# Patient Record
Sex: FEMALE | Race: BLACK OR AFRICAN AMERICAN | URBAN - METROPOLITAN AREA
[De-identification: names, ages, dates, MRNs, and addresses within clinical notes are randomized per-mention and may not be internally consistent; named-entity substitution may affect disease eponyms.]

---
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
## 2023-11-05 ENCOUNTER — HOSPITAL ENCOUNTER (INPATIENT)
Facility: HOSPITAL | Age: 18
LOS: 8 days | Discharge: HOME | DRG: 885 | End: 2023-11-13
Attending: STUDENT IN AN ORGANIZED HEALTH CARE EDUCATION/TRAINING PROGRAM | Admitting: PSYCHIATRY & NEUROLOGY

## 2023-11-05 DIAGNOSIS — F10.920 ACUTE ALCOHOLIC INTOXICATION WITHOUT COMPLICATION (HCC): Primary | ICD-10-CM

## 2023-11-05 PROBLEM — F32.4 MAJOR DEPRESSION SINGLE EPISODE, IN PARTIAL REMISSION (HCC): Status: ACTIVE | Noted: 2023-11-05

## 2023-11-05 PROBLEM — F33.0 MDD (MAJOR DEPRESSIVE DISORDER), RECURRENT EPISODE, MILD (HCC): Status: ACTIVE | Noted: 2023-11-05

## 2023-11-05 LAB
ALBUMIN SERPL-MCNC: 4.1 G/DL (ref 3.5–5)
ALBUMIN/GLOB SERPL: 0.9 (ref 1.1–2.2)
ALP SERPL-CCNC: 66 U/L (ref 45–117)
ALT SERPL-CCNC: 24 U/L (ref 12–78)
AMPHET UR QL SCN: NEGATIVE
ANION GAP SERPL CALC-SCNC: 9 MMOL/L (ref 5–15)
AST SERPL W P-5'-P-CCNC: 20 U/L (ref 15–37)
BARBITURATES UR QL SCN: NEGATIVE
BASOPHILS # BLD: 0.1 K/UL (ref 0–0.1)
BASOPHILS NFR BLD: 1 % (ref 0–1)
BENZODIAZ UR QL: NEGATIVE
BILIRUB SERPL-MCNC: 0.2 MG/DL (ref 0.2–1)
BUN SERPL-MCNC: 14 MG/DL (ref 6–20)
BUN/CREAT SERPL: 14 (ref 12–20)
CA-I BLD-MCNC: 10.1 MG/DL (ref 8.5–10.1)
CANNABINOIDS UR QL SCN: POSITIVE
CHLORIDE SERPL-SCNC: 110 MMOL/L (ref 97–108)
CO2 SERPL-SCNC: 25 MMOL/L (ref 21–32)
COCAINE UR QL SCN: POSITIVE
CREAT SERPL-MCNC: 0.98 MG/DL (ref 0.55–1.02)
DIFFERENTIAL METHOD BLD: NORMAL
EKG ATRIAL RATE: 83 BPM
EKG DIAGNOSIS: NORMAL
EKG P AXIS: 61 DEGREES
EKG P-R INTERVAL: 162 MS
EKG Q-T INTERVAL: 356 MS
EKG QRS DURATION: 86 MS
EKG QTC CALCULATION (BAZETT): 418 MS
EKG R AXIS: 66 DEGREES
EKG T AXIS: 46 DEGREES
EKG VENTRICULAR RATE: 83 BPM
EOSINOPHIL # BLD: 0 K/UL (ref 0–0.4)
EOSINOPHIL NFR BLD: 1 % (ref 0–7)
ERYTHROCYTE [DISTWIDTH] IN BLOOD BY AUTOMATED COUNT: 13.5 % (ref 11.5–14.5)
ETHANOL SERPL-MCNC: 261 MG/DL (ref 0–0.08)
ETHANOL SERPL-MCNC: 401 MG/DL (ref 0–0.08)
FLUAV RNA SPEC QL NAA+PROBE: NOT DETECTED
FLUBV RNA SPEC QL NAA+PROBE: NOT DETECTED
GLOBULIN SER CALC-MCNC: 4.6 G/DL (ref 2–4)
GLUCOSE SERPL-MCNC: 66 MG/DL (ref 65–100)
HCG SERPL QL: NEGATIVE
HCT VFR BLD AUTO: 44.7 % (ref 35–47)
HGB BLD-MCNC: 14.9 G/DL (ref 11.5–16)
IMM GRANULOCYTES # BLD AUTO: 0 K/UL (ref 0–0.04)
IMM GRANULOCYTES NFR BLD AUTO: 0 % (ref 0–0.5)
LYMPHOCYTES # BLD: 2.4 K/UL (ref 0.8–3.5)
LYMPHOCYTES NFR BLD: 38 % (ref 12–49)
Lab: ABNORMAL
MAGNESIUM SERPL-MCNC: 2.6 MG/DL (ref 1.6–2.4)
MCH RBC QN AUTO: 31.8 PG (ref 26–34)
MCHC RBC AUTO-ENTMCNC: 33.3 G/DL (ref 30–36.5)
MCV RBC AUTO: 95.5 FL (ref 80–99)
METHADONE UR QL: NEGATIVE
MONOCYTES # BLD: 0.5 K/UL (ref 0–1)
MONOCYTES NFR BLD: 8 % (ref 5–13)
NEUTS SEG # BLD: 3.4 K/UL (ref 1.8–8)
NEUTS SEG NFR BLD: 52 % (ref 32–75)
NRBC # BLD: 0 K/UL (ref 0–0.01)
NRBC BLD-RTO: 0 PER 100 WBC
OPIATES UR QL: NEGATIVE
PCP UR QL: NEGATIVE
PLATELET # BLD AUTO: 304 K/UL (ref 150–400)
PMV BLD AUTO: 10.1 FL (ref 8.9–12.9)
POTASSIUM SERPL-SCNC: 3.7 MMOL/L (ref 3.5–5.1)
PROT SERPL-MCNC: 8.7 G/DL (ref 6.4–8.2)
RBC # BLD AUTO: 4.68 M/UL (ref 3.8–5.2)
SARS-COV-2 RNA RESP QL NAA+PROBE: NOT DETECTED
SODIUM SERPL-SCNC: 144 MMOL/L (ref 136–145)
TRICHOMONAS RAPID AG: POSITIVE
WBC # BLD AUTO: 6.4 K/UL (ref 3.6–11)

## 2023-11-05 PROCEDURE — 83735 ASSAY OF MAGNESIUM: CPT

## 2023-11-05 PROCEDURE — 1240000000 HC EMOTIONAL WELLNESS R&B

## 2023-11-05 PROCEDURE — 87389 HIV-1 AG W/HIV-1&-2 AB AG IA: CPT

## 2023-11-05 PROCEDURE — 80307 DRUG TEST PRSMV CHEM ANLYZR: CPT

## 2023-11-05 PROCEDURE — 2580000003 HC RX 258: Performed by: STUDENT IN AN ORGANIZED HEALTH CARE EDUCATION/TRAINING PROGRAM

## 2023-11-05 PROCEDURE — 82077 ASSAY SPEC XCP UR&BREATH IA: CPT

## 2023-11-05 PROCEDURE — 85025 COMPLETE CBC W/AUTO DIFF WBC: CPT

## 2023-11-05 PROCEDURE — 36415 COLL VENOUS BLD VENIPUNCTURE: CPT

## 2023-11-05 PROCEDURE — 99285 EMERGENCY DEPT VISIT HI MDM: CPT

## 2023-11-05 PROCEDURE — 87808 TRICHOMONAS ASSAY W/OPTIC: CPT

## 2023-11-05 PROCEDURE — 93005 ELECTROCARDIOGRAM TRACING: CPT | Performed by: STUDENT IN AN ORGANIZED HEALTH CARE EDUCATION/TRAINING PROGRAM

## 2023-11-05 PROCEDURE — 84703 CHORIONIC GONADOTROPIN ASSAY: CPT

## 2023-11-05 PROCEDURE — 6370000000 HC RX 637 (ALT 250 FOR IP): Performed by: PSYCHIATRY & NEUROLOGY

## 2023-11-05 PROCEDURE — 87636 SARSCOV2 & INF A&B AMP PRB: CPT

## 2023-11-05 PROCEDURE — 80053 COMPREHEN METABOLIC PANEL: CPT

## 2023-11-05 RX ORDER — ACETAMINOPHEN 325 MG/1
650 TABLET ORAL EVERY 4 HOURS PRN
Status: DISCONTINUED | OUTPATIENT
Start: 2023-11-05 | End: 2023-11-13 | Stop reason: HOSPADM

## 2023-11-05 RX ORDER — LORAZEPAM 1 MG/1
1 TABLET ORAL ONCE
Status: COMPLETED | OUTPATIENT
Start: 2023-11-05 | End: 2023-11-05

## 2023-11-05 RX ORDER — 0.9 % SODIUM CHLORIDE 0.9 %
1000 INTRAVENOUS SOLUTION INTRAVENOUS ONCE
Status: COMPLETED | OUTPATIENT
Start: 2023-11-05 | End: 2023-11-05

## 2023-11-05 RX ORDER — CHLORDIAZEPOXIDE HYDROCHLORIDE 5 MG/1
10 CAPSULE, GELATIN COATED ORAL 3 TIMES DAILY
Status: DISCONTINUED | OUTPATIENT
Start: 2023-11-05 | End: 2023-11-08

## 2023-11-05 RX ORDER — TRAZODONE HYDROCHLORIDE 50 MG/1
50 TABLET ORAL NIGHTLY PRN
Status: DISCONTINUED | OUTPATIENT
Start: 2023-11-05 | End: 2023-11-13 | Stop reason: HOSPADM

## 2023-11-05 RX ORDER — SENNOSIDES A AND B 8.6 MG/1
1 TABLET, FILM COATED ORAL DAILY PRN
Status: DISCONTINUED | OUTPATIENT
Start: 2023-11-05 | End: 2023-11-13 | Stop reason: HOSPADM

## 2023-11-05 RX ORDER — HYDROXYZINE 50 MG/1
50 TABLET, FILM COATED ORAL 3 TIMES DAILY PRN
Status: DISCONTINUED | OUTPATIENT
Start: 2023-11-05 | End: 2023-11-13 | Stop reason: HOSPADM

## 2023-11-05 RX ADMIN — SODIUM CHLORIDE 1000 ML: 9 INJECTION, SOLUTION INTRAVENOUS at 05:07

## 2023-11-05 RX ADMIN — CHLORDIAZEPOXIDE HYDROCHLORIDE 10 MG: 5 CAPSULE ORAL at 23:45

## 2023-11-05 RX ADMIN — LORAZEPAM 1 MG: 1 TABLET ORAL at 23:45

## 2023-11-05 ASSESSMENT — SLEEP AND FATIGUE QUESTIONNAIRES
DO YOU USE A SLEEP AID: YES
DO YOU HAVE DIFFICULTY SLEEPING: YES
SLEEP PATTERN: DIFFICULTY FALLING ASLEEP;DISTURBED/INTERRUPTED SLEEP;RESTLESSNESS
AVERAGE NUMBER OF SLEEP HOURS: 9

## 2023-11-05 ASSESSMENT — LIFESTYLE VARIABLES
HOW MANY STANDARD DRINKS CONTAINING ALCOHOL DO YOU HAVE ON A TYPICAL DAY: 3 OR 4
HOW OFTEN DO YOU HAVE A DRINK CONTAINING ALCOHOL: 2-4 TIMES A MONTH

## 2023-11-05 ASSESSMENT — PAIN SCALES - GENERAL
PAINLEVEL_OUTOF10: 2
PAINLEVEL_OUTOF10: 0

## 2023-11-05 ASSESSMENT — PAIN DESCRIPTION - LOCATION: LOCATION: BUTTOCKS
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
## 2023-11-05 NOTE — ED NOTES
Pt in green gown, still has under clothes on (sweat pants, tank top, and bra)     Burke White, [address]  11/05/23 5666
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
## 2023-11-05 NOTE — DISCHARGE INSTRUCTIONS
Thank you! Thank you for allowing me to care for you in the emergency department. I sincerely hope that you are satisfied with your visit today. It is my goal to provide you with excellent care. Below you will find a list of your labs and imaging from your visit today if applicable. Should you have any questions regarding these results please do not hesitate to call the emergency department. Please review [facility] for a more detailed result list since the below list may not be comprehensive. Instructions on how to sign up to [employer] should be provided in this packet.     Labs -  Recent Results (from the past 12 hour(s))   CBC with Auto Differential    Collection Time: 11/05/23  4:33 AM   Result Value Ref Range    WBC 6.4 3.6 - 11.0 K/uL    RBC 4.68 3.80 - 5.20 M/uL    Hemoglobin 14.9 11.5 - 16.0 g/dL    Hematocrit 44.7 35.0 - 47.0 %    MCV 95.5 80.0 - 99.0 FL    MCH 31.8 26.0 - 34.0 PG    MCHC 33.3 30.0 - 36.5 g/dL    RDW 13.5 11.5 - 14.5 %    Platelets 324 297 - 871 K/uL    MPV 10.1 8.9 - 12.9 FL    Nucleated RBCs 0.0 0.0  WBC    nRBC 0.00 0.00 - 0.01 K/uL    Neutrophils % 52 32 - 75 %    Lymphocytes % 38 12 - 49 %    Monocytes % 8 5 - 13 %    Eosinophils % 1 0 - 7 %    Basophils % 1 0 - 1 %    Immature Granulocytes 0 0 - 0.5 %    Neutrophils Absolute 3.4 1.8 - 8.0 K/UL    Lymphocytes Absolute 2.4 0.8 - 3.5 K/UL    Monocytes Absolute 0.5 0.0 - 1.0 K/UL    Eosinophils Absolute 0.0 0.0 - 0.4 K/UL    Basophils Absolute 0.1 0.0 - 0.1 K/UL    Absolute Immature Granulocyte 0.0 0.00 - 0.04 K/UL    Differential Type AUTOMATED     Comprehensive Metabolic Panel    Collection Time: 11/05/23  4:33 AM   Result Value Ref Range    Sodium 144 136 - 145 mmol/L    Potassium 3.7 3.5 - 5.1 mmol/L    Chloride 110 (H) 97 - 108 mmol/L    CO2 25 21 - 32 mmol/L    Anion Gap 9 5 - 15 mmol/L    Glucose 66 65 - 100 mg/dL    BUN 14 6 - 20 mg/dL    Creatinine 0.98 0.55 - 1.02 mg/dL    Bun/Cre Ratio 14 12 - 20      Est, Glom Filt
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
## 2023-11-05 NOTE — ED NOTES
Nurse asked pt if she had any thoughts of wanting to harm herself or anyone else. Pt shook her head no and then yes and stated \"I just wanna go home. \" Pt was then asked if she had a plan to kill herself, she shook her head yes.       Anitha Green [place]  11/05/23 7991

## 2023-11-05 NOTE — ED NOTES
[facility]. Items removed from patient include     1 pink and orange large bag with multiple items in it. Patient states that mother can take bag with the items in it home.          Ave Peterson RN  11/05/23 8431
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
## 2023-11-05 NOTE — BSMART NOTE
Per Margarette with D19, TDO is recommended for this pt. Pt aware. Nilesh/Michaelle RN aware.   Dr Yasir Freedman aware

## 2023-11-05 NOTE — ED NOTES
Pt's mother called and spoke with Nurse. Mother states she going to try and come sit with her or get someone to come sit with her.        Arpit Russ RN  11/05/23 0959

## 2023-11-06 LAB
HIV 1+2 AB+HIV1 P24 AG SERPL QL IA: NONREACTIVE
HIV 1/2 RESULT COMMENT: NORMAL

## 2023-11-06 PROCEDURE — 6370000000 HC RX 637 (ALT 250 FOR IP): Performed by: PSYCHIATRY & NEUROLOGY

## 2023-11-06 PROCEDURE — 6370000000 HC RX 637 (ALT 250 FOR IP): Performed by: FAMILY MEDICINE

## 2023-11-06 PROCEDURE — 1240000000 HC EMOTIONAL WELLNESS R&B

## 2023-11-06 RX ORDER — METRONIDAZOLE 500 MG/1
500 TABLET ORAL EVERY 12 HOURS SCHEDULED
Status: COMPLETED | OUTPATIENT
Start: 2023-11-06 | End: 2023-11-13

## 2023-11-06 RX ORDER — FLUOXETINE 10 MG/1
10 CAPSULE ORAL DAILY
Status: DISCONTINUED | OUTPATIENT
Start: 2023-11-06 | End: 2023-11-08

## 2023-11-06 RX ADMIN — METRONIDAZOLE 500 MG: 500 TABLET ORAL at 21:11

## 2023-11-06 RX ADMIN — CHLORDIAZEPOXIDE HYDROCHLORIDE 10 MG: 5 CAPSULE ORAL at 14:35

## 2023-11-06 RX ADMIN — CHLORDIAZEPOXIDE HYDROCHLORIDE 10 MG: 5 CAPSULE ORAL at 21:11

## 2023-11-06 RX ADMIN — CHLORDIAZEPOXIDE HYDROCHLORIDE 10 MG: 5 CAPSULE ORAL at 08:12

## 2023-11-06 RX ADMIN — FLUOXETINE 10 MG: 10 CAPSULE ORAL at 14:35

## 2023-11-06 RX ADMIN — TRAZODONE HYDROCHLORIDE 50 MG: 50 TABLET ORAL at 21:11
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
## 2023-11-06 NOTE — BH NOTE
Pt positive for trichomonas. Dr Pebbles Hough notified. Dr Mckinley Barriga notified and verbal telephone orders given to start pt on flagyl for 7 days, first dose tonight.

## 2023-11-06 NOTE — CARE COORDINATION
11/06/23 1403   ITP   Date of Plan 11/06/23   Date of Next Review 11/16/23   Primary Diagnosis Code MDD (major depressive disorder), recurrent episode, mild ([address]) F33.0   Barriers to Treatment Need for psychoeducation   Strengths Incorporated in Plan Acknowledging need for assistance   Plan of Care   Long Term Goal (LTG) Stated in patient/guardian terms \"go home\"   Short Term Goal 1   Short Term Goal 1 Client will be oriented to program and staff, and participate in assessment process   Baseline Functioning to make patient comfortable with the environment while in the hospital   Target patient will be able to approach staff and voice appropriate needs   Objectives Client will participate in individual therapy;Client will participate in group therapy   Intervention 1 Assess safety   Frequency daily   Measured by Self report;Staff observation   Staff Responsible Clinical staff;[facility] staff   Intervention 2 Acknowledge client strengths   Frequency daily   Measured by Self report;Staff observation   Staff Responsible Clinical staff;[facility] staff   Intervention 3 Group therapy   Frequency daily   Measured by Staff observation;Self report   Staff Responsible Clinical staff;[facility] staff   STG Goal 1 Status: Patient Appears to be  Progressing toward treatment plan goal   Short Term Goal 2   Short Term Goal 2 Client will learn and demonstrate effective coping skills   Baseline Functioning to improve the over all quality of life   Target the patient will be able to use positive skills to deal with dialy life stressors   Objectives Client will participate in group therapy;Client will participate in individual therapy   Intervention 1 Indvidual therapy   Frequency daily   Measured by Self report;Staff observation   Staff Responsible Clinical staff;[facility] staff   Intervention 2 Group therapy   Frequency dialy   Measured by Staff observation;Self report   Staff Responsible Clinical staff;[facility] staff   Intervention 3 Indvidual therapy

## 2023-11-06 NOTE — GROUP NOTE
Group Therapy Note    Date: 11/6/2023    Group Start Time: 0802  Group End Time: 1400  Group Topic: Process Group - Inpatient    [facility] 2 [facility], [address]        Group Therapy Note  Process group was focused on the [email or web address]. Writer provided the pts with an informational sheet on the emotional, cummings and, reasonable mind. Pt interacted well with one another and provided positive feedback and encouragement. Writer asked the pts to describe their lives as a book title.    Attendees: 4-11         Notes:  Pt was encouraged to attend and chose not to       Signature:  Jaun Lino

## 2023-11-06 NOTE — GROUP NOTE
Group Therapy Note    Date: 11/6/2023    Group Start Time: 1115  Group End Time: 1200  Group Topic: Education Group - Inpatient    [facility] 2 [address] [facility] NON ACUTE    Shilpa Cowan        Group Therapy Note    Facilitated group to introduce the definition of self-esteem and discuss information relating to creating steps to greater self-appreciation and recognizing symptoms of self-defeat     Attendees: 2/12      Notes:  Encouraged but did not attend    Discipline Responsible: Recreational Therapist      Signature:  YEYO Blount

## 2023-11-06 NOTE — GROUP NOTE
Group Therapy Note    Date: 11/6/2023    Group Start Time: 4387  Group End Time: 6857  Group Topic: Recreational    SSR 2 [facility] NON ACUTE    Kailyn Mathis        Group Therapy Note    Pt was receptive to listening to music and songs selected while working on leisure task. Interacted with peers and staff.  Declined to work on leisure task     Attendees: 5/10       Notes:  Encouraged but did not attend    Discipline Responsible: Recreational Therapist      Signature:  Randy Conklin

## 2023-11-06 NOTE — PLAN OF CARE
Problem: Anxiety  Goal: Will report anxiety at manageable levels  Description: INTERVENTIONS:  1. Administer medication as ordered  2. Teach and rehearse alternative coping skills  3. Provide emotional support with 1:1 interaction with staff  Outcome: [address] [facility] 114 Not Progressing     Problem: Coping  Goal: Pt/Family able to verbalize concerns and demonstrate effective coping strategies  Description: INTERVENTIONS:  1. Assist patient/family to identify coping skills, available support systems and cultural and spiritual values  2. Provide emotional support, including active listening and acknowledgement of concerns of patient and caregivers  3. Reduce environmental stimuli, as able  4. Instruct patient/family in relaxation techniques, as appropriate  5. Assess for spiritual pain/suffering and initiate Spiritual Care, Psychosocial Clinical Specialist consults as needed  Outcome: [address] [facility] 114 Not Progressing     Problem: Depression/Self Harm  Goal: Effect of psychiatric condition will be minimized and patient will be protected from self harm  Description: INTERVENTIONS:  1. Assess impact of patient's symptoms on level of functioning, self care needs and offer support as indicated  2. Assess patient/family knowledge of depression, impact on illness and need for teaching  3. Provide emotional support, presence and reassurance  4. Assess for possible suicidal thoughts or ideation. If patient expresses suicidal thoughts or statements do not leave alone, initiate Suicide Precautions, move to a room close to the nursing station and obtain sitter  5.  Initiate consults as appropriate with Mental Health Professional, Spiritual Care, Psychosocial CNS, and consider a recommendation to the LIP for a Psychiatric Consultation  Outcome: [address] [facility] 114 Not Progressing
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
## 2023-11-06 NOTE — ED NOTES
Report to [facility] on 2S. Patient up with Security, [address] and [facility] per [facility].       Ibeth Strange, [address]  11/05/23 2111

## 2023-11-06 NOTE — BH NOTE
DAY SHIFT    Pt up ad gilma on unit. Pt isolative to room most of the day. Pt does attend to ADLs and personal hygiene. Pt withdrawn and guarded. Pt presents with flat affect. Pt denies depression and anxiety. Pt denies SI/HI. Pt denies AVH. Pt calm and cooperative. Pt denies signs and symptoms of withdrawal and none observed. CIWA-0. Pt takes medication as prescribed without any complaints. Close observations continued to ensure pt safety.

## 2023-11-07 PROCEDURE — 6370000000 HC RX 637 (ALT 250 FOR IP): Performed by: PSYCHIATRY & NEUROLOGY

## 2023-11-07 PROCEDURE — 6370000000 HC RX 637 (ALT 250 FOR IP): Performed by: FAMILY MEDICINE

## 2023-11-07 PROCEDURE — 1240000000 HC EMOTIONAL WELLNESS R&B

## 2023-11-07 RX ADMIN — TRAZODONE HYDROCHLORIDE 50 MG: 50 TABLET ORAL at 20:33

## 2023-11-07 RX ADMIN — CHLORDIAZEPOXIDE HYDROCHLORIDE 10 MG: 5 CAPSULE ORAL at 20:33

## 2023-11-07 RX ADMIN — CHLORDIAZEPOXIDE HYDROCHLORIDE 10 MG: 5 CAPSULE ORAL at 08:14

## 2023-11-07 RX ADMIN — FLUOXETINE 10 MG: 10 CAPSULE ORAL at 08:13

## 2023-11-07 RX ADMIN — METRONIDAZOLE 500 MG: 500 TABLET ORAL at 20:33

## 2023-11-07 RX ADMIN — CHLORDIAZEPOXIDE HYDROCHLORIDE 10 MG: 5 CAPSULE ORAL at 14:23

## 2023-11-07 RX ADMIN — METRONIDAZOLE 500 MG: 500 TABLET ORAL at 08:14

## 2023-11-07 NOTE — CONSULTS
Constitutional: pt is oriented to person, place, and time. HENT:   Head: Normocephalic and atraumatic. Eyes: Pupils are equal, round, and reactive to light. EOM are normal.   Cardiovascular: Normal rate, regular rhythm and normal heart sounds. Pulmonary/Chest: Breath sounds normal. No wheezes. No rales. Exhibits no tenderness. Abdominal: Soft. Bowel sounds are normal. There is no abdominal tenderness. There is no rebound and no guarding. Musculoskeletal: Normal range of motion. Neurological: pt is alert and oriented to person, place, and time. Alert. Normal strength. No cranial nerve deficit or sensory deficit. Displays a negative Romberg sign. Data Review:   No results found for this or any previous visit (from the past 24 hour(s)).     No orders to display        Assessment:       Major depression with suicidal ideation  Suicidal attempt with drinking alcohol  Trichomoniasis        Plan:   Start on Flagyl 500 twice a day for 7 days  Librium 10 mg 3 times a day Prozac 10 mg daily

## 2023-11-07 NOTE — PLAN OF CARE
Problem: Coping  Goal: Pt/Family able to verbalize concerns and demonstrate effective coping strategies  Description: INTERVENTIONS:  1. Assist patient/family to identify coping skills, available support systems and cultural and spiritual values  2. Provide emotional support, including active listening and acknowledgement of concerns of patient and caregivers  3. Reduce environmental stimuli, as able  4. Instruct patient/family in relaxation techniques, as appropriate  5. Assess for spiritual pain/suffering and initiate Spiritual Care, Psychosocial Clinical Specialist consults as needed  Outcome: Progressing     Problem: Depression/Self Harm  Goal: Effect of psychiatric condition will be minimized and patient will be protected from self harm  Description: INTERVENTIONS:  1. Assess impact of patient's symptoms on level of functioning, self care needs and offer support as indicated  2. Assess patient/family knowledge of depression, impact on illness and need for teaching  3. Provide emotional support, presence and reassurance  4. Assess for possible suicidal thoughts or ideation. If patient expresses suicidal thoughts or statements do not leave alone, initiate Suicide Precautions, move to a room close to the nursing station and obtain sitter  5.  Initiate consults as appropriate with Mental Health Professional, Spiritual Care, Psychosocial CNS, and consider a recommendation to the LIP for a Psychiatric Consultation  Outcome: Progressing

## 2023-11-07 NOTE — GROUP NOTE
Group Therapy Note    Date: 11/7/2023    Group Start Time: 5801  Group End Time: 1330  Group Topic: Process Group - Inpatient    [facility]R 2 [facility] [place], [address] [place] [address],[address] Therapy Note  Writer attempted to hold group and encouraged pts to come. Writer waited in the group room for 15 minutes. One pt stood across from the group room and did not respond when writer asked her to join.   Attendees: 0-7         Notes:  Pt was encouraged to attend and chose not to         Signature:  Mimi Mejía
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
## 2023-11-07 NOTE — BH NOTE
Behavioral Health Treatment Team Note     Patient goal(s) for today: \"drink less alcohol to feel better\"  Treatment team focus/goals: meds management, dc planning, group therapy    Progress note: Pt was seen in the hallway as she was walking around. Pt presented as calm, somewhat guarded with a flat affect. Pt stated that she could not sleep well last night because she kept thinking about how much she misses her son. Pt reported her anxiety and depression to be a 3 on a scale of 1-10. Pt cont to meet criteria for inpt stay for further stabilization through meds management.      LOS:  2  Expected LOS: 5-7    Insurance info/prescription coverage:  no insurance on file  Date of last family contact:  pt declined to sign nina  Family requesting physician contact today:  No  Discharge plan:  to stabilize pt  Guns in the home:  No   Outpatient provider(s):  D19    Participating treatment team members: Dar Mckeon, * (assigned SW), Alexis High, MS

## 2023-11-07 NOTE — GROUP NOTE
Group Therapy Note    Date: 11/7/2023    Group Start Time: 2641  Group End Time: 1600  Group Topic: Recreational    SSR 2 [address] [place] [address] Therapy Note    Attendees: 4/7    Recreational Therapist facilitated structured leisure skills group to introduce healthy leisure skills as positive way to cope and manage mood. Notes:  Did not attend group despite encouragement      Discipline Responsible: Recreational Therapist      Signature:   YEYO Quinn

## 2023-11-07 NOTE — BH NOTE
Nursing Note    Patient is alert and oriented x 4. She denies any SI/HI/AH/VH. Patient denies any anxiety or depression. Flat affect and calm mood. Patient seen talking on the telephone in the dayroom. She had a friend bring clothes to the ER reception desk. CIWA = 0. Patient voiced no complaints and accepted her medications without difficulty. Staff will continue to monitor patient for safety.

## 2023-11-08 PROCEDURE — 6370000000 HC RX 637 (ALT 250 FOR IP): Performed by: FAMILY MEDICINE

## 2023-11-08 PROCEDURE — 1240000000 HC EMOTIONAL WELLNESS R&B

## 2023-11-08 PROCEDURE — 6370000000 HC RX 637 (ALT 250 FOR IP): Performed by: PSYCHIATRY & NEUROLOGY

## 2023-11-08 RX ORDER — CHLORDIAZEPOXIDE HYDROCHLORIDE 10 MG/1
10 CAPSULE, GELATIN COATED ORAL 3 TIMES DAILY PRN
Status: DISCONTINUED | OUTPATIENT
Start: 2023-11-08 | End: 2023-11-10

## 2023-11-08 RX ORDER — FLUOXETINE HYDROCHLORIDE 20 MG/1
20 CAPSULE ORAL DAILY
Status: DISCONTINUED | OUTPATIENT
Start: 2023-11-09 | End: 2023-11-13 | Stop reason: HOSPADM

## 2023-11-08 RX ADMIN — HYDROXYZINE HYDROCHLORIDE 50 MG: 50 TABLET, FILM COATED ORAL at 21:09

## 2023-11-08 RX ADMIN — CHLORDIAZEPOXIDE HYDROCHLORIDE 10 MG: 5 CAPSULE ORAL at 08:09

## 2023-11-08 RX ADMIN — METRONIDAZOLE 500 MG: 500 TABLET ORAL at 21:09

## 2023-11-08 RX ADMIN — FLUOXETINE 10 MG: 10 CAPSULE ORAL at 08:09

## 2023-11-08 RX ADMIN — TRAZODONE HYDROCHLORIDE 50 MG: 50 TABLET ORAL at 21:09

## 2023-11-08 RX ADMIN — METRONIDAZOLE 500 MG: 500 TABLET ORAL at 08:09

## 2023-11-08 NOTE — BH NOTE
Nursing Note    Patient is alert and oriented x 4. She denies any SI/HI/AH/VH. Patient denies any anxiety or depression. Flat affect and calm mood. Patient seen talking on the telephone and watching   TV in the dayroom. She voiced no concerns and accepted her medications without difficulty. Staff will continue to monitor patient for safety.
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
## 2023-11-08 NOTE — BH NOTE
Alert and oriented x 4. Affect and mood are flat and depressed. Denies SI/HI/AVH a this time. Cooperative and pleasant. Visible on unit, interacting with select peers. Questions re: upcoming hearing. Medication and meal compliant.

## 2023-11-08 NOTE — GROUP NOTE
Group Therapy Note    Date: 11/8/2023    Group Start Time: 7132  Group End Time: 1400  Group Topic: Process Group - Inpatient    SSR 2 [facility] AC[facility]    DARIN Kenney        Group Therapy Note: Patients were either sleeping, discharging or declining participation so writer provided each pt with a handout \"The 30 Day [address] Challenge\" to enhance their mood and improve overall health and well being. Attendees: 1 (pt in 207)           Notes:  Pt was crying, very upset d/t being involuntarily committed up to 7 days. Writer provided handout and provided encouragement.        Signature:  DARIN Kenney

## 2023-11-09 PROCEDURE — 6370000000 HC RX 637 (ALT 250 FOR IP): Performed by: PSYCHIATRY & NEUROLOGY

## 2023-11-09 PROCEDURE — 6370000000 HC RX 637 (ALT 250 FOR IP): Performed by: FAMILY MEDICINE

## 2023-11-09 PROCEDURE — 1240000000 HC EMOTIONAL WELLNESS R&B

## 2023-11-09 RX ADMIN — METRONIDAZOLE 500 MG: 500 TABLET ORAL at 21:00

## 2023-11-09 RX ADMIN — METRONIDAZOLE 500 MG: 500 TABLET ORAL at 08:28

## 2023-11-09 RX ADMIN — FLUOXETINE 20 MG: 20 CAPSULE ORAL at 08:28

## 2023-11-09 RX ADMIN — TRAZODONE HYDROCHLORIDE 50 MG: 50 TABLET ORAL at 21:00

## 2023-11-09 NOTE — GROUP NOTE
Group Therapy Note    Date: 11/9/2023    Group Start Time: 1110  Group End Time: 1150  Group Topic: Education Group - Inpatient    SSR 2 BH NON ACUTE    Julius Berumen        Group Therapy Note    Facilitated discussion focus on identifying different barriers that has prevented progress and identifying ways to confront them     Attendees: 3/6       Notes:  Encouraged but did not attend    Discipline Responsible: Recreational Therapist      Signature:  Jaden Almanza

## 2023-11-09 NOTE — GROUP NOTE
Group Therapy Note    Date: 11/9/2023    Group Start Time: 0559  Group End Time: 1810  Group Topic: Recreational    SSR 2 BH NON ACUTE    Khushbu Simental        Group Therapy Note    Facilitated leisure skills group to reinforce positive coping and to manage mood through music, social interaction, group activities and art task     Attendees: 2/8       Notes: Encouraged but did not attend    Discipline Responsible: Recreational Therapist      Signature:  Mehrdad Cordova

## 2023-11-09 NOTE — BH NOTE
DISCHARGE SUMMARY    Madeleine Sharma  : 2005  MRN: [id number]    The patient Leland Bowen exhibits the ability to control behavior in a less restrictive environment. Patient's level of functioning is improving. No assaultive/destructive behavior has been observed for the past 24 hours. No suicidal/homicidal threat or behavior has been observed for the past 24 hours. There is no evidence of serious medication side effects. Patient has not been in physical or protective restraints for at least the past 24 hours. If weapons involved, how are they secured? N/a    Is patient aware of and in agreement with discharge plan? yes    Arrangements for medication:  Prescriptions see chart    Copy of discharge instructions to provider?:  yes    Arrangements for transportation home:  family    Keep all follow up appointments as scheduled, continue to take prescribed medications per physician instructions.   Mental health crisis number:  [facility] Emergency WARM LINE      7-299-889-MHAV ([phone])      M-F: 9am to 9pm      Sat & Sun: 5pm - 9pm  National suicide prevention lines:                             3-383-OIFQEDV (5-535-561-840-185-5994)       4-001-040-TALK ([phone])    Crisis Text Line:  Text HOME to 410225

## 2023-11-09 NOTE — GROUP NOTE
Group Therapy Note    Date: 11/8/2023    Group Start Time: 1945  Group End Time: 2030  Group Topic: Recreational    SSR 2 BH NON ACUTE    Harpal Robison        Group Therapy Note    Attendees: 2/5    Recreational Therapist facilitated structured leisure skills group to introduce healthy leisure skills as positive way to cope and manage mood. Patient's Goal:  Client will learn and demonstrate effective coping skills    Notes: Attended group and listened to songs with peers. Was receptive to intervention and responded to prompts from staff. Attended entire session and was attentive during group. Status After Intervention:  Improved    Participation Level: Active Listener and Interactive    Participation Quality: Appropriate and Attentive      Speech:  normal      Thought Process/Content: Logical      Affective Functioning: Congruent      Mood:  calm       Level of consciousness:  Alert and Attentive      Response to Learning: Progressing to goal      Endings: None Reported    Modes of Intervention: Activity      Discipline Responsible: Recreational Therapist      Signature:   YEYO Russ
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
## 2023-11-09 NOTE — GROUP NOTE
Group Therapy Note    Date: 11/9/2023    Group Start Time: 1300  Group End Time: 1330  Group Topic: Process Group - Inpatient    [facility] 2 [facility]    Seema Evans        Group Therapy Note: A one on one session was conducted with each patient to discuss the emotion of anxiety, it's triggers and positive coping mechanism.      Attendees: 1       Patient's Goal:  to attend group    Notes:  Pt was encouraged to engage but decline, adding she wanted to rest.      Signature:  Lesa Hernandez

## 2023-11-09 NOTE — BH NOTE
Patient was cooperative, kind and friendly, however poor eye contact, almost like shame and guilt as nurse was attempting to discuss about her drug and alcohol use and how to get clean and be there for her 3year old son. She was seen on the unit out in the day room. She was med compliant and did receive a trazodone for sleep and atarax for anxiety of 3/10. She rated her depression 2/10, and denies SI, HI and hallucinations. Patient participated in snack and music group. Pt has appeared to be sleeping well tonight, with no signs of distress noted, respirations regular and unlabored. Will continue to monitor patient every 15 mins as per unit protocol.

## 2023-11-09 NOTE — BH NOTE
Behavioral Health Treatment Team Note     Patient goal(s) for today: \"read book\"  Treatment team focus/goals: meds management, dc planning, group therapy. Progress note: Pt was seen in her room as she was resting. Pt woke up easily with a sad/flat affect. Pt presented to be calm, soft spoken, somewhat guarded. Pt cont to request dc and was explained that she has been committed for upto 7 days, to which pt voiced disappointment. Pt did state that her anxiety med that she took last night make her somewhat sleepy and so she slept well. Pt described her mood as good but her affect presented to be sad/flat. Pt seems to be minimizing her mental health and sub use issues. Pt cont to meet criteria for inpt stay for further stabilization through meds management.       LOS:  4  Expected LOS: 5-8    Insurance info/prescription coverage:  no insurance on file  Date of last family contact:   [email or web address] [phone] on 11/9 and provided her with current clinical presentation  Family requesting physician contact today:  No  Discharge plan:  to stabilize pt  Guns in the home:  No   Outpatient provider(s):  D19    Participating treatment team members: Priya Jett, * (assigned SW), Jarod Lopez, MS

## 2023-11-09 NOTE — BH NOTE
Pt.is lying in bed at present time, denies feeling suicidal or depressed, pt.insight is limited, a little upset when she thought she was going home today. isolates to self a lot,no agitation or aggressive behaviors. per Dr. Dewayne Lopez pt.will move to non acute unit,no concerns voiced, remains on close observation.

## 2023-11-10 PROCEDURE — 6370000000 HC RX 637 (ALT 250 FOR IP): Performed by: FAMILY MEDICINE

## 2023-11-10 PROCEDURE — 6370000000 HC RX 637 (ALT 250 FOR IP): Performed by: PSYCHIATRY & NEUROLOGY

## 2023-11-10 PROCEDURE — 1240000000 HC EMOTIONAL WELLNESS R&B

## 2023-11-10 RX ADMIN — TRAZODONE HYDROCHLORIDE 50 MG: 50 TABLET ORAL at 21:20

## 2023-11-10 RX ADMIN — METRONIDAZOLE 500 MG: 500 TABLET ORAL at 21:20

## 2023-11-10 RX ADMIN — FLUOXETINE 20 MG: 20 CAPSULE ORAL at 08:28

## 2023-11-10 RX ADMIN — METRONIDAZOLE 500 MG: 500 TABLET ORAL at 08:28

## 2023-11-10 NOTE — BH NOTE
Pt noted up on unit during intervals, sitting in dayroom playing games and interacting with peers, pt presented with a brighter affect, denies any SI/HI, compliant with medication, pleasant, family brought patient some personal items which was search and given to patient. Pt remain on close observation for safety.

## 2023-11-10 NOTE — GROUP NOTE
Group Therapy Note    Date: 11/10/2023    Group Start Time: 1100  Group End Time: 1130  Group Topic: Process Group - Inpatient    SSR 2 [facility] ACUTE    Seema Evans        Group Therapy Note: A group session was facilitated where long and short term goals were discussed and the importance of having goals and the purpose they serve. Attendees: 4       Patient's Goal:  to attend groups    Notes:  Pt introduced self and stated goals are set in life to \"better ourselves and motivate us\". Short term goal-\"work on my health\". Long term goal-\"be there for my son and family\"    Status After Intervention:  Improved    Participation Level:  Active Listener and Interactive    Participation Quality: Appropriate, Attentive, Sharing, and Supportive      Speech:  normal      Thought Process/Content: Logical  Linear      Affective Functioning: Congruent      Mood: calm      Level of consciousness:  Alert, Oriented x4, and Attentive      Response to Learning: Able to verbalize current knowledge/experience, Able to verbalize/acknowledge new learning, Able to retain information, Capable of insight, and Able to change behavior      Endings: None Reported    Modes of Intervention: Education, Support, and Socialization      Discipline Responsible: /Counselor      Signature:  Iglesia Brice

## 2023-11-10 NOTE — BH NOTE
Behavioral Health Treatment Team Note     Patient goal(s) for today: \"groups\"  Treatment team focus/goals: meds management, dc planning, group therapy    Progress note: Pt was seen in her room as she was resting. Pt woke up easily. Pt presented to be calm, cooperative, alert, oriented, somewhat guarded, with a flat affect. Pt stated she slept \"ok\" Pt cont to voice disappointment that she is still inpt but agreed that she will try to show improvement when it comes to participating in groups. Pt cont to meet criteria for inpt stay for further stabilization through meds management.      LOS:  5  Expected LOS: 5-8    Insurance info/prescription coverage:   no insurance on file  Date of last family contact:  [email or web address] 63 0000 on 11/9  Family requesting physician contact today:  No  Discharge plan:  to stabilize pt  Guns in the home:  No   Outpatient provider(s):  D19    Participating treatment team members: Luis Maciel, * (assigned SW), Noe Reinoso MS

## 2023-11-10 NOTE — BH NOTE
DAYSHIFT NOTE:     Patient up for breakfast and goes back to her room to lay down. Patient wakes up on approach and is pleasant with an overall flat affect. Patient denies having any depression and or anxiety. Denies SI/HI. Denies AH/VH. Patient is medication compliant. Patient interacts well with her roommate. Patient is noted to work on coloring activities throughout the day. Attends groups. Close observations continued to ensure patient safety.

## 2023-11-10 NOTE — GROUP NOTE
Group Therapy Note    Date: 11/10/2023    Group Start Time: 1340  Group End Time: 6825  Group Topic: Recreational    SSR 2 BH NON ACUTE    Hilary Navarrete        Group Therapy Note    Facilitated leisure skills group to reinforce positive coping and to manage mood through music, social interaction, group activities and art task    Attendees: 3/5       Patient's Goal:  Attend group daily     Notes:  Pt was receptive to listening to music and songs she selected while working on leisure task. Interacted with peer and staff    Status After Intervention:  Improved    Participation Level:  Active Listener and Interactive    Participation Quality: Appropriate and Attentive      Speech:  normal      Thought Process/Content: Logical      Affective Functioning: Congruent      Mood:  Calm      Level of consciousness:  Attentive      Response to Learning: Progressing to goal      Endings: None Reported    Modes of Intervention: Socialization and Activity      Discipline Responsible: Recreational Therapist      Signature:  Leonel Abbott

## 2023-11-11 PROCEDURE — 6370000000 HC RX 637 (ALT 250 FOR IP): Performed by: PSYCHIATRY & NEUROLOGY

## 2023-11-11 PROCEDURE — 1240000000 HC EMOTIONAL WELLNESS R&B

## 2023-11-11 PROCEDURE — 6370000000 HC RX 637 (ALT 250 FOR IP): Performed by: FAMILY MEDICINE

## 2023-11-11 RX ADMIN — METRONIDAZOLE 500 MG: 500 TABLET ORAL at 20:32

## 2023-11-11 RX ADMIN — FLUOXETINE 20 MG: 20 CAPSULE ORAL at 08:57

## 2023-11-11 RX ADMIN — METRONIDAZOLE 500 MG: 500 TABLET ORAL at 08:57

## 2023-11-11 RX ADMIN — TRAZODONE HYDROCHLORIDE 50 MG: 50 TABLET ORAL at 20:33

## 2023-11-11 NOTE — BH NOTE
Patient observed in the dayroom on the phone. Patient affect is flat and mood labile. Patient denies SI/HI ,DENIES  auditory and visual hallucinations. Continues  to remain on close observation. Patient compliant with medications.

## 2023-11-11 NOTE — GROUP NOTE
Group Therapy Note    Date: 11/11/2023    Group Start Time: 5132  Group End Time: 1400  Group Topic: Relaxation    SSR 2 BH NON ACUTE    Tanisha Tejeda        Group Therapy Note    Attendees: 2/7    Facilitated structured relaxation group to identify positive ways to cope and manage daily stressors. Introduced relaxation technique using Guided Imagery to practice relaxation in group. Notes:  Did not attend group despite encouragement    Discipline Responsible: Recreational Therapist      Signature:   YEYO Denson

## 2023-11-11 NOTE — GROUP NOTE
Group Therapy Note    Date: 11/11/2023    Group Start Time: 0900  Group End Time: 1015  Group Topic: Education Group - Inpatient    [facility]R [address]        Group Therapy Note    Attendees: 4/6    Facilitated structured group to increase awareness of triggers and encourage and to explore places, people and situations that cause triggers and positive ways to manage triggers. Patient's Goal:  Client will learn and demonstrate effective coping skills    Notes: Attended group and actively participated in group discussion. Was able to identify triggers. Was also able to verbalize positive ways to cope and stated she has been triggered by social media in the past. Related being away from it while in the hospital is possible start to eliminate it so she doesn't have to be triggered by it and support her in decreasing exposure to the trigger. Status After Intervention:  Improved    Participation Level: Active Listener and Interactive    Participation Quality: Appropriate and Attentive      Speech:  normal      Thought Process/Content: Logical      Affective Functioning: Congruent      Mood:  calm      Level of consciousness:  Alert      Response to Learning: Progressing to goal      Endings: None Reported    Modes of Intervention: Activity      Discipline Responsible: Recreational Therapist      Signature:   YEYO Jerry

## 2023-11-11 NOTE — GROUP NOTE
Group Therapy Note    Date: 11/11/2023    Group Start Time: 1634  Group End Time: 3957  Group Topic: Recreational    SSR 2 [address] Mukul, [address] [place] [address]        Group Therapy Note    Attendees: 3/7        Recreational Therapist facilitated structured leisure skills group to introduce healthy leisure skills as positive way to cope and manage mood             Patient's Goal:  Client will learn and demonstrate effective coping skills    Notes: Attended group. Listened to songs played during group session. Pt was cooperative and receptive to intervention. Responded to staff with cues/prompts and encouragement to participate. Status After Intervention:  Improved    Participation Level: Active Listener    Participation Quality: Appropriate      Speech:  normal      Thought Process/Content: Logical      Affective Functioning: Congruent      Mood:  calm       Level of consciousness:  Alert      Response to Learning: Progressing to goal      Endings: None Reported    Modes of Intervention: Activity      Discipline Responsible: Recreational Therapist      Signature:   YEYO Hogue

## 2023-11-11 NOTE — BH NOTE
Client is pleasant and cooperative. Alert and oriented x 3. She ate about 60 percent of her breakfast.Denies feeling suicidal or homicidal.Appearance is semi-tidy. Encouraged to attend scheduled groups and unit activities. Remains on close observation for safety.

## 2023-11-12 PROCEDURE — 6370000000 HC RX 637 (ALT 250 FOR IP): Performed by: FAMILY MEDICINE

## 2023-11-12 PROCEDURE — 1240000000 HC EMOTIONAL WELLNESS R&B

## 2023-11-12 PROCEDURE — 6370000000 HC RX 637 (ALT 250 FOR IP): Performed by: PSYCHIATRY & NEUROLOGY

## 2023-11-12 RX ADMIN — METRONIDAZOLE 500 MG: 500 TABLET ORAL at 21:04

## 2023-11-12 RX ADMIN — TRAZODONE HYDROCHLORIDE 50 MG: 50 TABLET ORAL at 21:04

## 2023-11-12 RX ADMIN — FLUOXETINE 20 MG: 20 CAPSULE ORAL at 08:24

## 2023-11-12 RX ADMIN — METRONIDAZOLE 500 MG: 500 TABLET ORAL at 08:24

## 2023-11-12 NOTE — BH NOTE
Patient  observed patient on the phone. Compliant with medications. Alert and oriented. Denies auditory and visual hallucinations. Denies HI/si at this time The current medical regimen is effective;  continue present plan and medications. Continue to monitor closely.

## 2023-11-12 NOTE — BH NOTE
Client is pleasant and cooperative. Alert and oriented x 3. She has a good appetite and reports sleeping well. Denies feeling suicidal or homicidal.She is social with peers. Attending scheduled groups and unit activities. Takes meds as prescribed and denies any side effects. Excited about possible discharge in  the morning. No verbal complaints.

## 2023-11-12 NOTE — GROUP NOTE
Group Therapy Note    Date: 11/12/2023    Group Start Time: 8603  Group End Time: 1400  Group Topic: Recreational    SSR 2 [address] [place], [address] Therapy Note    Attendees: 6/8    Recreational Therapist facilitated structured leisure skills group to introduce healthy leisure skills as positive way to cope and manage mood. Patient's Goal:   Client will learn and demonstrate effective coping skills       Notes: Attended group. Listened to songs played during group session. Pt was cooperative and receptive to intervention. Responded to staff with cues/prompts and encouragement to participate. Status After Intervention:  Improved    Participation Level: Active Listener    Participation Quality: Appropriate      Speech:  normal      Thought Process/Content: Logical      Affective Functioning: Congruent      Mood:  calm       Level of consciousness:  Alert      Response to Learning: Progressing to goal      Endings: None Reported    Modes of Intervention: Activity      Discipline Responsible: Recreational Therapist      Signature: Tanisha Tejeda.  CTRS

## 2023-11-12 NOTE — GROUP NOTE
Group Therapy Note    Date: 11/12/2023    Group Start Time: 0930  Group End Time: 3920  Group Topic: Education Group - Inpatient    SSR 2 BH NON ACUTE    Karina Martinez        Group Therapy Note    Attendees: 4/7    Facilitated structured group discussion to identify protective factors that have been valuable and protective factors that could be improved in managing in stressors. Patient's Goal:  Client will learn and demonstrate effective coping skills    Notes: Attended group discussion related to Protective Factors. Pt reviewed Protective Factors and was able to rate level of strength in those factors in relation to managing stress. Pt was cooperative and receptive to intervention. Pt stated  she has supportive family-mom and grandmother. Status After Intervention:  Improved    Participation Level: Active Listener and Interactive    Participation Quality: Appropriate and Attentive      Speech:  normal      Thought Process/Content: Logical      Affective Functioning: Congruent      Mood:  calm      Level of consciousness:  Alert and Attentive      Response to Learning: Progressing to goal      Endings: None Reported    Modes of Intervention: Education and Support      Discipline Responsible: Recreational Therapist      Signature:   YEYO Segal

## 2023-11-13 VITALS
BODY MASS INDEX: 31.89 KG/M2 | HEART RATE: 78 BPM | TEMPERATURE: 98.1 F | OXYGEN SATURATION: 100 % | DIASTOLIC BLOOD PRESSURE: 52 MMHG | RESPIRATION RATE: 16 BRPM | HEIGHT: 63 IN | WEIGHT: 180 LBS | SYSTOLIC BLOOD PRESSURE: 87 MMHG

## 2023-11-13 PROCEDURE — 6370000000 HC RX 637 (ALT 250 FOR IP): Performed by: PSYCHIATRY & NEUROLOGY

## 2023-11-13 PROCEDURE — 6370000000 HC RX 637 (ALT 250 FOR IP): Performed by: FAMILY MEDICINE

## 2023-11-13 RX ORDER — TRAZODONE HYDROCHLORIDE 50 MG/1
50 TABLET ORAL NIGHTLY PRN
Qty: 30 TABLET | Refills: 0 | Status: SHIPPED | OUTPATIENT
Start: 2023-11-13

## 2023-11-13 RX ORDER — FLUOXETINE HYDROCHLORIDE 20 MG/1
20 CAPSULE ORAL DAILY
Qty: 30 CAPSULE | Refills: 3 | Status: SHIPPED | OUTPATIENT
Start: 2023-11-14

## 2023-11-13 RX ADMIN — FLUOXETINE 20 MG: 20 CAPSULE ORAL at 09:10

## 2023-11-13 RX ADMIN — METRONIDAZOLE 500 MG: 500 TABLET ORAL at 09:10

## 2023-11-13 NOTE — BH NOTE
Patient was pleasant, calm and cooperative. She was med compliant and did take a trazodone for sleep. She had a brightened affect, was seen eating snack in the day room and socializing with roommate and peers and talking on the phone quite a bit. She denies depression, anxiety, SI, HI and hallucinations. Pt has appeared to sleep well tonight, with no signs of distress noted, respirations regular and unlabored.  Will continue to monitor patient every 15 mins as per unit protocol

## 2023-11-13 NOTE — GROUP NOTE
Group Therapy Note    Date: 11/13/2023    Group Start Time: 1115  Group End Time: 1200  Group Topic: Process Group - Inpatient    SSR 2 [facility] TH ACUTE    DARIN Mckeon        Group Therapy Note: Facilitator encouraged the group to identify emotions and adaptive coping strategies. Reviewed SMART Goal methodology to increase the likelihood of achieving their goals. Attendees: 7       Patient's Goal:  To attend groups    Notes:  Pt shared that she loves her child. She's excited to get home and pay bills and celebrate her birthday next week.      Status After Intervention:  Improved    Participation Level: Interactive    Participation Quality: Appropriate, Attentive, and Sharing      Speech:  normal      Thought Process/Content: Logical      Affective Functioning: Congruent      Mood: euthymic      Level of consciousness:  Alert, Oriented x4, and Attentive      Response to Learning: Able to verbalize current knowledge/experience, Able to verbalize/acknowledge new learning, Able to retain information, Capable of insight, Able to change behavior, and Progressing to goal      Endings: None Reported    Modes of Intervention: Education, Support, and Socialization      Discipline Responsible: /Counselor      Signature:  DARIN Mckeon

## 2023-11-13 NOTE — BH NOTE
Patient given personal belongings, valuables and discharge instructions, patient verbalized understanding,  Patient left unit ambulatory to the care of her family.

## 2023-11-13 NOTE — BH NOTE
Patient pleasant, cooperative, and medication compliant. Patient denied SI, HI, AH, VH, depression, and anxiety. Patient remains on close observation, Q 15 minute checks.

## 2024-03-06 ENCOUNTER — HOSPITAL ENCOUNTER (EMERGENCY)
Facility: HOSPITAL | Age: 19
Discharge: HOME | End: 2024-03-06
Attending: EMERGENCY MEDICINE

## 2024-03-06 VITALS
TEMPERATURE: 98.2 F | SYSTOLIC BLOOD PRESSURE: 133 MMHG | WEIGHT: 180 LBS | HEIGHT: 64 IN | RESPIRATION RATE: 16 BRPM | HEART RATE: 65 BPM | BODY MASS INDEX: 30.73 KG/M2 | DIASTOLIC BLOOD PRESSURE: 87 MMHG | OXYGEN SATURATION: 100 %

## 2024-03-06 DIAGNOSIS — R30.0 DYSURIA: Primary | ICD-10-CM

## 2024-03-06 DIAGNOSIS — Z11.3 SCREEN FOR STD (SEXUALLY TRANSMITTED DISEASE): ICD-10-CM

## 2024-03-06 DIAGNOSIS — N89.8 VAGINAL ITCHING: ICD-10-CM

## 2024-03-06 LAB
APPEARANCE UR: CLEAR
BACTERIA URNS QL MICRO: NEGATIVE /HPF
BILIRUB UR QL: NEGATIVE
CLUE CELLS VAG QL WET PREP: NEGATIVE
COLOR UR: YELLOW
EPITH CASTS URNS QL MICRO: ABNORMAL /LPF
GLUCOSE UR STRIP.AUTO-MCNC: NEGATIVE MG/DL
HCG UR QL: NEGATIVE
HGB UR QL STRIP: NEGATIVE
KETONES UR QL STRIP.AUTO: NEGATIVE MG/DL
KOH PREP SPEC: NORMAL
LEUKOCYTE ESTERASE UR QL STRIP.AUTO: NEGATIVE
Lab: NORMAL
MUCOUS THREADS URNS QL MICRO: ABNORMAL /LPF
NITRITE UR QL STRIP.AUTO: NEGATIVE
PH UR STRIP: 6 (ref 5–8)
PROT UR STRIP-MCNC: NEGATIVE MG/DL
RBC #/AREA URNS HPF: ABNORMAL /HPF (ref 0–5)
SP GR UR REFRACTOMETRY: 1.01 (ref 1–1.03)
T VAGINALIS VAG QL WET PREP: NEGATIVE
URINE CULTURE IF INDICATED: ABNORMAL
UROBILINOGEN UR QL STRIP.AUTO: 1 EU/DL (ref 0.2–1)
WBC URNS QL MICRO: ABNORMAL /HPF (ref 0–4)

## 2024-03-06 PROCEDURE — 87210 SMEAR WET MOUNT SALINE/INK: CPT

## 2024-03-06 PROCEDURE — 99283 EMERGENCY DEPT VISIT LOW MDM: CPT

## 2024-03-06 PROCEDURE — 81001 URINALYSIS AUTO W/SCOPE: CPT

## 2024-03-06 PROCEDURE — 81025 URINE PREGNANCY TEST: CPT

## 2024-03-06 PROCEDURE — 87591 N.GONORRHOEAE DNA AMP PROB: CPT

## 2024-03-06 PROCEDURE — 87491 CHLMYD TRACH DNA AMP PROBE: CPT

## 2024-03-06 ASSESSMENT — PAIN SCALES - GENERAL: PAINLEVEL_OUTOF10: 0

## 2024-03-06 ASSESSMENT — PAIN - FUNCTIONAL ASSESSMENT: PAIN_FUNCTIONAL_ASSESSMENT: 0-10

## 2024-03-07 LAB
C TRACH DNA SPEC QL NAA+PROBE: NEGATIVE
N GONORRHOEA DNA SPEC QL NAA+PROBE: NEGATIVE
SAMPLE TYPE: NORMAL
SERVICE CMNT-IMP: NORMAL
SPECIMEN SOURCE: NORMAL

## 2024-03-07 NOTE — ED PROVIDER NOTES
Transportation Needs (11/17/2023)    Transportation Problems ([facility] HRSN)     In the past 12 months, has lack of reliable transportation kept you from medical appointments, meetings, work or from getting things needed for daily living?: Not on file   Physical Activity: Not on file   Stress: Not on file   Social Connections: Not on file   Intimate Partner Violence: Not on file   Depression: Not on file   Housing Stability: Low Risk  (11/5/2023)    Housing Stability Vital Sign     Unable to Pay for Housing in the Last Year: No     Number of Places Lived in the Last Year: 1     Unstable Housing in the Last Year: No   Interpersonal Safety: Not At Risk (11/5/2023)    Interpersonal Safety ([facility] HRSN)     How often does anyone, including family and friends, physically hurt you?: Never   Utilities: Not At Risk (11/5/2023)    [facility] Utilities     Threatened with loss of utilities: No       PHYSICAL EXAM   Physical Exam  ***    SCREENINGS                  LAB, EKG AND DIAGNOSTIC RESULTS   Labs:  Recent Results (from the past 12 hour(s))   Wet prep, genital    Collection Time: 03/06/24  7:15 PM    Specimen: Vaginal   Result Value Ref Range    Clue Cells, Wet Prep Negative      Trich, Wet Prep Negative     Urinalysis with Reflex to Culture    Collection Time: 03/06/24  7:15 PM    Specimen: Urine   Result Value Ref Range    Color, UA Yellow      Appearance Clear Clear      Specific Gravity, UA 1.015 1.003 - 1.030      pH, Urine 6.0 5.0 - 8.0      Protein, UA Negative Negative mg/dL    Glucose, UA Negative Negative mg/dL    Ketones, Urine Negative Negative mg/dL    Bilirubin Urine Negative Negative      Blood, Urine Negative Negative      Urobilinogen, Urine 1.0 0.2 - 1.0 EU/dL    Nitrite, Urine Negative Negative      Leukocyte Esterase, Urine Negative Negative      WBC, UA 0-4 0 - 4 /hpf    RBC, UA 0-5 0 - 5 /hpf    Epithelial Cells UA Moderate (A) Few /lpf    BACTERIA, URINE Negative Negative /hpf    Urine Culture if Indicated  (electronically signed)    (Please note that parts of this dictation were completed with voice recognition software. Quite often unanticipated grammatical, syntax, homophones, and other interpretive errors are inadvertently transcribed by the computer software. Please disregards these errors. Please excuse any errors that have escaped final proofreading.)     Zeina Ott MD  03/11/24 [id number]

## 2024-03-07 NOTE — DISCHARGE INSTRUCTIONS
Thank you!  Thank you for allowing me to care for you in the emergency department. It is my goal to provide you with excellent care.  Please fill out the survey that will come to you by mail or email since we listen to your feedback!     Below you will find a list of your tests from today's visit.  Should you have any questions, please do not hesitate to call the emergency department.    Labs  Recent Results (from the past 12 hour(s))   Wet prep, genital    Collection Time: 03/06/24  7:15 PM    Specimen: Vaginal   Result Value Ref Range    Clue Cells, Wet Prep Negative      Trich, Wet Prep Negative     Urinalysis with Reflex to Culture    Collection Time: 03/06/24  7:15 PM    Specimen: Urine   Result Value Ref Range    Color, UA Yellow      Appearance Clear Clear      Specific Gravity, UA 1.015 1.003 - 1.030      pH, Urine 6.0 5.0 - 8.0      Protein, UA Negative Negative mg/dL    Glucose, UA Negative Negative mg/dL    Ketones, Urine Negative Negative mg/dL    Bilirubin Urine Negative Negative      Blood, Urine Negative Negative      Urobilinogen, Urine 1.0 0.2 - 1.0 EU/dL    Nitrite, Urine Negative Negative      Leukocyte Esterase, Urine Negative Negative      WBC, UA PENDING /hpf    RBC, UA PENDING /hpf    Epithelial Cells UA PENDING /lpf    BACTERIA, URINE PENDING /hpf    Urine Culture if Indicated PENDING    POC Pregnancy Urine Qual    Collection Time: 03/06/24  7:15 PM   Result Value Ref Range    Preg Test, Ur Negative Negative         Radiologic Studies  No orders to display     ------------------------------------------------------------------------------------------------------------  The exam and treatment you received in the Emergency Department were for an urgent problem and are not intended as complete care. It is important that you follow-up with a doctor, nurse practitioner, or physician assistant to:  (1) confirm your diagnosis,  (2) re-evaluation of changes in your illness and treatment, and (3)  for ongoing care. Please take your discharge instructions with you when you go to your follow-up appointment.     If you have any problem arranging a follow-up appointment, contact the Emergency Department.  If your symptoms become worse or you do not improve as expected and you are unable to reach your health care provider, please return to the Emergency Department. We are available 24 hours a day.     If a prescription has been provided, please have it filled as soon as possible to prevent a delay in treatment. If you have any questions or reservations about taking the medication due to side effects or interactions with other medications, please call your primary care provider or contact the ER.

## 2024-04-17 ENCOUNTER — APPOINTMENT (OUTPATIENT)
Facility: HOSPITAL | Age: 19
End: 2024-04-17

## 2024-04-17 ENCOUNTER — HOSPITAL ENCOUNTER (EMERGENCY)
Facility: HOSPITAL | Age: 19
Discharge: HOME | End: 2024-04-17
Attending: EMERGENCY MEDICINE

## 2024-04-17 VITALS
OXYGEN SATURATION: 100 % | SYSTOLIC BLOOD PRESSURE: 121 MMHG | TEMPERATURE: 98.3 F | HEART RATE: 69 BPM | RESPIRATION RATE: 16 BRPM | DIASTOLIC BLOOD PRESSURE: 71 MMHG

## 2024-04-17 DIAGNOSIS — H11.32 SUBCONJUNCTIVAL HEMORRHAGE OF LEFT EYE: ICD-10-CM
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
## 2024-04-17 PROCEDURE — 99284 EMERGENCY DEPT VISIT MOD MDM: CPT

## 2024-04-17 PROCEDURE — 70486 CT MAXILLOFACIAL W/O DYE: CPT

## 2024-04-17 PROCEDURE — 71045 X-RAY EXAM CHEST 1 VIEW: CPT

## 2024-04-17 PROCEDURE — 70450 CT HEAD/BRAIN W/O DYE: CPT

## 2024-04-17 RX ORDER — ERYTHROMYCIN 5 MG/G
OINTMENT OPHTHALMIC
Qty: 3.5 G | Refills: 0 | Status: SHIPPED | OUTPATIENT
Start: 2024-04-17 | End: 2024-04-27

## 2024-04-17 ASSESSMENT — LIFESTYLE VARIABLES
HOW OFTEN DO YOU HAVE A DRINK CONTAINING ALCOHOL: NEVER
HOW MANY STANDARD DRINKS CONTAINING ALCOHOL DO YOU HAVE ON A TYPICAL DAY: PATIENT DOES NOT DRINK

## 2024-04-17 ASSESSMENT — PAIN SCALES - GENERAL: PAINLEVEL_OUTOF10: 4

## 2024-04-17 ASSESSMENT — PAIN - FUNCTIONAL ASSESSMENT: PAIN_FUNCTIONAL_ASSESSMENT: 0-10

## 2024-04-17 NOTE — ED PROVIDER NOTES
erythromycin 5 MG/GM ophthalmic ointment           DISCONTINUED MEDICATIONS:  Current Discharge Medication List          I am the Primary Clinician of Record. Diane Muniz MD (electronically signed)    (Please note that parts of this dictation were completed with voice recognition software. Quite often unanticipated grammatical, syntax, homophones, and other interpretive errors are inadvertently transcribed by the computer software. Please disregards these errors. Please excuse any errors that have escaped final proofreading.)        Diane Muniz MD  04/18/24 1934
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
## 2024-07-04 ENCOUNTER — HOSPITAL ENCOUNTER (EMERGENCY)
Facility: HOSPITAL | Age: 19
Discharge: HOME | End: 2024-07-04

## 2024-07-04 VITALS
TEMPERATURE: 98.6 F | HEART RATE: 72 BPM | OXYGEN SATURATION: 99 % | WEIGHT: 160 LBS | DIASTOLIC BLOOD PRESSURE: 80 MMHG | RESPIRATION RATE: 18 BRPM | BODY MASS INDEX: 27.46 KG/M2 | SYSTOLIC BLOOD PRESSURE: 122 MMHG

## 2024-07-04 DIAGNOSIS — Z32.01 POSITIVE PREGNANCY TEST: Primary | ICD-10-CM

## 2024-07-04 LAB
HCG UR QL: POSITIVE
HCG, URINE, POC: POSITIVE
Lab: 0
NEGATIVE QC PASS/FAIL: NORMAL
POSITIVE QC PASS/FAIL: NORMAL

## 2024-07-04 PROCEDURE — 81025 URINE PREGNANCY TEST: CPT

## 2024-07-04 PROCEDURE — 99283 EMERGENCY DEPT VISIT LOW MDM: CPT

## 2024-07-04 ASSESSMENT — PAIN SCALES - GENERAL: PAINLEVEL_OUTOF10: 8

## 2024-07-04 ASSESSMENT — PAIN - FUNCTIONAL ASSESSMENT: PAIN_FUNCTIONAL_ASSESSMENT: 0-10

## 2024-07-04 ASSESSMENT — PAIN DESCRIPTION - LOCATION: LOCATION: ABDOMEN

## 2024-07-04 ASSESSMENT — PAIN DESCRIPTION - DESCRIPTORS: DESCRIPTORS: CRAMPING

## 2024-07-05 NOTE — ED TRIAGE NOTES
Pt reports abd cramping for  the past few days + home pregnancy test lmp unknown pt reports possible may pt denies vaginal bleeding request to know how many weeks pregnant

## 2024-07-05 NOTE — ED PROVIDER NOTES
[facility] EMERGENCY DEPT  EMERGENCY DEPARTMENT HISTORY AND PHYSICAL EXAM      Date: 7/4/2024  Patient Name: Renee Gonzalez  MRN: [id number]  YOB: 2005  Date of evaluation: 7/4/2024  Provider: Kevon Yuen PA-C   Note Started: 8:38 PM EDT 7/4/24    HISTORY OF PRESENT ILLNESS     Chief Complaint   Patient presents with    Pregnancy Test       History Provided By: Patient    HPI: Renee Gonzalez is a 18 y.o. female G1, P1 presents to the ER for pregnancy test.  Patient states she has been having some lower abdominal cramping for the last couple days, did a urine home pregnancy test that was positive she reports the ED for confirmation.  Denies any vaginal bleeding, pain, nausea, vomiting.    PAST MEDICAL HISTORY   Past Medical History:  History reviewed. No pertinent past medical history.    Past Surgical History:  History reviewed. No pertinent surgical history.    Family History:  History reviewed. No pertinent family history.    Social History:  Social History     Tobacco Use    Smoking status: Former     Types: Cigarettes    Smokeless tobacco: Never   Substance Use Topics    Alcohol use: Not Currently     Alcohol/week: 4.0 standard drinks of alcohol     Types: 4 Shots of liquor per week    Drug use: Not Currently     Frequency: 3.0 times per week     Types: Marijuana (Weed), Cocaine       Allergies:  Allergies   Allergen Reactions    Sulfa Antibiotics Hives       PCP: None, None    Current Meds:   No current facility-administered medications for this encounter.     Current Outpatient Medications   Medication Sig Dispense Refill    FLUoxetine (PROZAC) 20 MG capsule Take 1 capsule by mouth daily 30 capsule 3    traZODone (DESYREL) 50 MG tablet Take 1 tablet by mouth nightly as needed for Sleep 30 tablet 0       Social Determinants of Health:   Social Determinants of Health     Tobacco Use: Medium Risk (7/4/2024)    Patient History     Smoking Tobacco Use: Former     Smokeless Tobacco Use: Never